# Patient Record
Sex: MALE | Race: OTHER | Employment: PART TIME | URBAN - METROPOLITAN AREA
[De-identification: names, ages, dates, MRNs, and addresses within clinical notes are randomized per-mention and may not be internally consistent; named-entity substitution may affect disease eponyms.]

---

## 2024-06-13 ENCOUNTER — HOSPITAL ENCOUNTER (EMERGENCY)
Age: 24
Discharge: PSYCHIATRIC OR SUBSTANCE USE FACILITY | End: 2024-06-14
Attending: EMERGENCY MEDICINE

## 2024-06-13 VITALS
TEMPERATURE: 98.4 F | HEART RATE: 64 BPM | DIASTOLIC BLOOD PRESSURE: 67 MMHG | OXYGEN SATURATION: 100 % | SYSTOLIC BLOOD PRESSURE: 109 MMHG | RESPIRATION RATE: 16 BRPM

## 2024-06-13 DIAGNOSIS — R44.0 VERBAL AUDITORY HALLUCINATION: ICD-10-CM

## 2024-06-13 DIAGNOSIS — F29 PSYCHOSIS, UNSPECIFIED PSYCHOSIS TYPE (HCC): Primary | ICD-10-CM

## 2024-06-13 LAB
ALBUMIN SERPL-MCNC: 4.8 G/DL (ref 3.4–5)
ALBUMIN/GLOB SERPL: 1.8 {RATIO} (ref 1.1–2.2)
ALP SERPL-CCNC: 74 U/L (ref 40–129)
ALT SERPL-CCNC: 12 U/L (ref 10–40)
AMPHETAMINES UR QL SCN>1000 NG/ML: ABNORMAL
ANION GAP SERPL CALCULATED.3IONS-SCNC: 13 MMOL/L (ref 3–16)
APAP SERPL-MCNC: <5 UG/ML (ref 10–30)
AST SERPL-CCNC: 14 U/L (ref 15–37)
BARBITURATES UR QL SCN>200 NG/ML: ABNORMAL
BASOPHILS # BLD: 0 K/UL (ref 0–0.2)
BASOPHILS NFR BLD: 0.5 %
BENZODIAZ UR QL SCN>200 NG/ML: ABNORMAL
BILIRUB SERPL-MCNC: 0.4 MG/DL (ref 0–1)
BILIRUB UR QL STRIP.AUTO: NEGATIVE
BUN SERPL-MCNC: 14 MG/DL (ref 7–20)
CALCIUM SERPL-MCNC: 9.6 MG/DL (ref 8.3–10.6)
CANNABINOIDS UR QL SCN>50 NG/ML: POSITIVE
CHLORIDE SERPL-SCNC: 100 MMOL/L (ref 99–110)
CLARITY UR: CLEAR
CO2 SERPL-SCNC: 25 MMOL/L (ref 21–32)
COCAINE UR QL SCN: ABNORMAL
COLOR UR: YELLOW
CREAT SERPL-MCNC: 0.7 MG/DL (ref 0.9–1.3)
DEPRECATED RDW RBC AUTO: 12.5 % (ref 12.4–15.4)
DRUG SCREEN COMMENT UR-IMP: ABNORMAL
EOSINOPHIL # BLD: 0.2 K/UL (ref 0–0.6)
EOSINOPHIL NFR BLD: 2.2 %
ETHANOLAMINE SERPL-MCNC: NORMAL MG/DL (ref 0–0.08)
FENTANYL SCREEN, URINE: ABNORMAL
FLUAV RNA RESP QL NAA+PROBE: NOT DETECTED
FLUBV RNA RESP QL NAA+PROBE: NOT DETECTED
GFR SERPLBLD CREATININE-BSD FMLA CKD-EPI: >90 ML/MIN/{1.73_M2}
GLUCOSE SERPL-MCNC: 113 MG/DL (ref 70–99)
GLUCOSE UR STRIP.AUTO-MCNC: NEGATIVE MG/DL
HCT VFR BLD AUTO: 45.5 % (ref 40.5–52.5)
HGB BLD-MCNC: 15.9 G/DL (ref 13.5–17.5)
HGB UR QL STRIP.AUTO: NEGATIVE
KETONES UR STRIP.AUTO-MCNC: NEGATIVE MG/DL
LEUKOCYTE ESTERASE UR QL STRIP.AUTO: NEGATIVE
LYMPHOCYTES # BLD: 1.9 K/UL (ref 1–5.1)
LYMPHOCYTES NFR BLD: 24.3 %
MCH RBC QN AUTO: 30.7 PG (ref 26–34)
MCHC RBC AUTO-ENTMCNC: 34.9 G/DL (ref 31–36)
MCV RBC AUTO: 87.8 FL (ref 80–100)
METHADONE UR QL SCN>300 NG/ML: ABNORMAL
MONOCYTES # BLD: 0.6 K/UL (ref 0–1.3)
MONOCYTES NFR BLD: 7.4 %
MUCOUS THREADS #/AREA URNS LPF: ABNORMAL /LPF
NEUTROPHILS # BLD: 5.1 K/UL (ref 1.7–7.7)
NEUTROPHILS NFR BLD: 65.6 %
NITRITE UR QL STRIP.AUTO: NEGATIVE
OPIATES UR QL SCN>300 NG/ML: ABNORMAL
OXYCODONE UR QL SCN: ABNORMAL
PCP UR QL SCN>25 NG/ML: ABNORMAL
PH UR STRIP.AUTO: 7 [PH] (ref 5–8)
PH UR STRIP: 7 [PH]
PLATELET # BLD AUTO: 218 K/UL (ref 135–450)
PMV BLD AUTO: 8.4 FL (ref 5–10.5)
POTASSIUM SERPL-SCNC: 3.9 MMOL/L (ref 3.5–5.1)
PROT SERPL-MCNC: 7.4 G/DL (ref 6.4–8.2)
PROT UR STRIP.AUTO-MCNC: ABNORMAL MG/DL
RBC # BLD AUTO: 5.18 M/UL (ref 4.2–5.9)
RBC #/AREA URNS HPF: ABNORMAL /HPF (ref 0–4)
SALICYLATES SERPL-MCNC: <0.3 MG/DL (ref 15–30)
SARS-COV-2 RNA RESP QL NAA+PROBE: NOT DETECTED
SODIUM SERPL-SCNC: 138 MMOL/L (ref 136–145)
SP GR UR STRIP.AUTO: 1.02 (ref 1–1.03)
UA COMPLETE W REFLEX CULTURE PNL UR: ABNORMAL
UA DIPSTICK W REFLEX MICRO PNL UR: YES
URN SPEC COLLECT METH UR: ABNORMAL
UROBILINOGEN UR STRIP-ACNC: 0.2 E.U./DL
WBC # BLD AUTO: 7.8 K/UL (ref 4–11)
WBC #/AREA URNS HPF: ABNORMAL /HPF (ref 0–5)

## 2024-06-13 PROCEDURE — 82077 ASSAY SPEC XCP UR&BREATH IA: CPT

## 2024-06-13 PROCEDURE — 80179 DRUG ASSAY SALICYLATE: CPT

## 2024-06-13 PROCEDURE — 99285 EMERGENCY DEPT VISIT HI MDM: CPT

## 2024-06-13 PROCEDURE — 80053 COMPREHEN METABOLIC PANEL: CPT

## 2024-06-13 PROCEDURE — 80307 DRUG TEST PRSMV CHEM ANLYZR: CPT

## 2024-06-13 PROCEDURE — 81001 URINALYSIS AUTO W/SCOPE: CPT

## 2024-06-13 PROCEDURE — 80143 DRUG ASSAY ACETAMINOPHEN: CPT

## 2024-06-13 PROCEDURE — 87636 SARSCOV2 & INF A&B AMP PRB: CPT

## 2024-06-13 PROCEDURE — 90791 PSYCH DIAGNOSTIC EVALUATION: CPT | Performed by: SOCIAL WORKER

## 2024-06-13 PROCEDURE — 85025 COMPLETE CBC W/AUTO DIFF WBC: CPT

## 2024-06-13 ASSESSMENT — PAIN - FUNCTIONAL ASSESSMENT: PAIN_FUNCTIONAL_ASSESSMENT: NONE - DENIES PAIN

## 2024-06-13 NOTE — ED PROVIDER NOTES
Emergency Department Attending SUPERVISORY Provider Note  Location: [facility]  ED  6/13/2024     Chief Complaint   Patient presents with    Anxiety    Depression     Anxiety and depression, started hearing voices in his head 4 months ago. No SI or HI. Says the voices are telling him they're going to kill him.        PATIENT ID:  Bharath Ojeda is a 23 y.o. male    History reviewed. No pertinent past medical history.    Bharath Ojeda was evaluated in the Emergency Department for concerns of hearing voices over the past 4 months, uncle bedside saying that they are hurtful \"and harmful.  Patient does not really divulge to the family what they are telling him to do.  Uncle is translating, patient primarily speaks English.  He has not tried any self-harm, and does not have any history of any ingestions.  Has never been treated for anything like this.  Uncle himself says he used to hear similar voices, and required treatment.  No other concerns including any suicidal or homicidal ideation.  No falls or injuries.  No recent medical concerns or any sort of hospitalizations or new medications..          Vitals:    06/13/24 2226   BP: 109/67   Pulse: 64   Resp: 16   Temp:    SpO2: 100%        BASIC EXAM:  No acute distress.  Interactive, conversational, pleasant.  Respiratory rate regular, lungs are clear to auscultation bilaterally.  No obvious murmurs.  No significant leg swelling.        No orders to display       Labs Reviewed   URINALYSIS WITH REFLEX TO CULTURE - Abnormal; Notable for the following components:       Result Value    Protein, UA TRACE (*)     All other components within normal limits   URINE DRUG SCREEN - Abnormal; Notable for the following components:    Cannabinoid Scrn, Ur POSITIVE (*)     All other components within normal limits   SALICYLATE LEVEL - Abnormal; Notable for the following components:    Salicylate Lvl <0.3 (*)     All other components within normal limits

## 2024-06-14 ENCOUNTER — HOSPITAL ENCOUNTER (INPATIENT)
Age: 24
LOS: 3 days | Discharge: HOME | DRG: 885 | End: 2024-06-17
Attending: PSYCHIATRY & NEUROLOGY | Admitting: PSYCHIATRY & NEUROLOGY

## 2024-06-14 PROBLEM — F29 PSYCHOSIS, UNSPECIFIED PSYCHOSIS TYPE (HCC): Status: ACTIVE | Noted: 2024-06-14

## 2024-06-14 PROBLEM — F12.90 CANNABIS USE DISORDER: Status: ACTIVE | Noted: 2024-06-14

## 2024-06-14 PROCEDURE — 6370000000 HC RX 637 (ALT 250 FOR IP): Performed by: PSYCHIATRY & NEUROLOGY

## 2024-06-14 PROCEDURE — 1240000000 HC EMOTIONAL WELLNESS R&B

## 2024-06-14 PROCEDURE — 6370000000 HC RX 637 (ALT 250 FOR IP)

## 2024-06-14 PROCEDURE — 99223 1ST HOSP IP/OBS HIGH 75: CPT | Performed by: PSYCHIATRY & NEUROLOGY

## 2024-06-14 RX ORDER — ACETAMINOPHEN 325 MG/1
650 TABLET ORAL EVERY 8 HOURS PRN
Status: DISCONTINUED | OUTPATIENT
Start: 2024-06-14 | End: 2024-06-17 | Stop reason: HOSPADM

## 2024-06-14 RX ORDER — ACETAMINOPHEN 325 MG/1
650 TABLET ORAL EVERY 4 HOURS PRN
Status: DISCONTINUED | OUTPATIENT
Start: 2024-06-14 | End: 2024-06-14

## 2024-06-14 RX ORDER — OLANZAPINE 5 MG/1
5 TABLET ORAL 3 TIMES DAILY PRN
Status: DISCONTINUED | OUTPATIENT
Start: 2024-06-14 | End: 2024-06-17 | Stop reason: HOSPADM

## 2024-06-14 RX ORDER — HYDROXYZINE 50 MG/1
50 TABLET, FILM COATED ORAL 3 TIMES DAILY PRN
Status: DISCONTINUED | OUTPATIENT
Start: 2024-06-14 | End: 2024-06-17 | Stop reason: HOSPADM

## 2024-06-14 RX ORDER — MAGNESIUM HYDROXIDE/ALUMINUM HYDROXICE/SIMETHICONE 120; 1200; 1200 MG/30ML; MG/30ML; MG/30ML
30 SUSPENSION ORAL EVERY 6 HOURS PRN
Status: DISCONTINUED | OUTPATIENT
Start: 2024-06-14 | End: 2024-06-17 | Stop reason: HOSPADM

## 2024-06-14 RX ORDER — IBUPROFEN 400 MG/1
400 TABLET ORAL EVERY 6 HOURS PRN
Status: DISCONTINUED | OUTPATIENT
Start: 2024-06-14 | End: 2024-06-14

## 2024-06-14 RX ORDER — TRAZODONE HYDROCHLORIDE 50 MG/1
50 TABLET ORAL NIGHTLY PRN
Status: DISCONTINUED | OUTPATIENT
Start: 2024-06-14 | End: 2024-06-17 | Stop reason: HOSPADM

## 2024-06-14 RX ORDER — OLANZAPINE 5 MG/1
5 TABLET ORAL EVERY 4 HOURS PRN
Status: DISCONTINUED | OUTPATIENT
Start: 2024-06-14 | End: 2024-06-14

## 2024-06-14 RX ORDER — POLYETHYLENE GLYCOL 3350 17 G
2 POWDER IN PACKET (EA) ORAL
Status: DISCONTINUED | OUTPATIENT
Start: 2024-06-14 | End: 2024-06-17 | Stop reason: HOSPADM

## 2024-06-14 RX ORDER — DIPHENHYDRAMINE HYDROCHLORIDE 50 MG/ML
50 INJECTION INTRAMUSCULAR; INTRAVENOUS EVERY 4 HOURS PRN
Status: DISCONTINUED | OUTPATIENT
Start: 2024-06-14 | End: 2024-06-14

## 2024-06-14 RX ADMIN — HYDROXYZINE HYDROCHLORIDE 50 MG: 50 TABLET, FILM COATED ORAL at 02:58

## 2024-06-14 RX ADMIN — OLANZAPINE 5 MG: 5 TABLET, FILM COATED ORAL at 20:36

## 2024-06-14 RX ADMIN — TRAZODONE HYDROCHLORIDE 50 MG: 50 TABLET ORAL at 20:36

## 2024-06-14 ASSESSMENT — LIFESTYLE VARIABLES
HOW MANY STANDARD DRINKS CONTAINING ALCOHOL DO YOU HAVE ON A TYPICAL DAY: PATIENT DOES NOT DRINK
HOW OFTEN DO YOU HAVE A DRINK CONTAINING ALCOHOL: NEVER

## 2024-06-14 ASSESSMENT — PATIENT HEALTH QUESTIONNAIRE - PHQ9
1. LITTLE INTEREST OR PLEASURE IN DOING THINGS: SEVERAL DAYS
1. LITTLE INTEREST OR PLEASURE IN DOING THINGS: SEVERAL DAYS
SUM OF ALL RESPONSES TO PHQ QUESTIONS 1-9: 2
SUM OF ALL RESPONSES TO PHQ9 QUESTIONS 1 & 2: 2
SUM OF ALL RESPONSES TO PHQ QUESTIONS 1-9: 2
SUM OF ALL RESPONSES TO PHQ QUESTIONS 1-9: 2
SUM OF ALL RESPONSES TO PHQ9 QUESTIONS 1 & 2: 2
SUM OF ALL RESPONSES TO PHQ QUESTIONS 1-9: 2
2. FEELING DOWN, DEPRESSED OR HOPELESS: SEVERAL DAYS
SUM OF ALL RESPONSES TO PHQ QUESTIONS 1-9: 2
2. FEELING DOWN, DEPRESSED OR HOPELESS: SEVERAL DAYS
SUM OF ALL RESPONSES TO PHQ QUESTIONS 1-9: 2

## 2024-06-14 ASSESSMENT — SLEEP AND FATIGUE QUESTIONNAIRES
DO YOU HAVE DIFFICULTY SLEEPING: YES
SLEEP PATTERN: NORMAL
DO YOU HAVE DIFFICULTY SLEEPING: NO
DO YOU USE A SLEEP AID: NO
AVERAGE NUMBER OF SLEEP HOURS: 8
DO YOU USE A SLEEP AID: NO
SLEEP PATTERN: DISTURBED/INTERRUPTED SLEEP
AVERAGE NUMBER OF SLEEP HOURS: 8

## 2024-06-14 NOTE — ED NOTES
Chucho Wolfe, [facility], assigned as patient sitter @ 6144    Patient has been changed into a paper hospital gown per protocol.    Patient has had all of his belongings placed into bags, labeled, and removed from patient's vicinity. Patient did not present to the ER with any jewelry or piercings.     Patient's room has had all items that are removable and not medically required removed from the room per protocol.     Patient is currently calmly lying in bed.    Sitter is positioned in doorway and will remain vigilant.

## 2024-06-14 NOTE — ED NOTES
Per provider pt is now on a 72 hour hold for transfer to a psych facility.  Patient room has been modified to remove extraneous ligature risks prior to patient arrival. Pt Azeri speaking.   phone at bedside.  Constant Observer at bedside, patient aware of placement and reason.     For safety, patient is placed in a hospital gown without ties.     A witnessed search of patient belongings was performed by EM Mina and CRICKET Rankin . Patient belongings were secured and placed outside room.

## 2024-06-14 NOTE — PROGRESS NOTES
Home Medication Reconciliation Status          [x] COMPLETE       Medication history has been reviewed and obtained from the following source(s):       [] patient/family verbal report             [] patient/family provided written list       [] external pharmacy   [] external facility list         []  Provider notified that home medication reconciliation is complete          [] IN PROGRESS       Medication reconciliation marked in progress at this time due to:       [] patient/family poor historian      [] waiting arrival of family to clarify       [] waiting for accurate list        [] external pharmacy needs called      * Follow up is needed.          [] UNABLE TO ASSESS       Medication reconciliation is incomplete and unable to assess at this time due to:       [] critical patient condition   [] patient is unresponsive        [] no family available                       [] unknown pharmacy       [] anonymous patient          * Follow up is needed.      [] Pharmacy consult placed for medication reconciliation assistance   Additional comments:  patient denies taking any medication currently.

## 2024-06-14 NOTE — PROGRESS NOTES
4 Eyes Skin Assessment     NAME:  Bharath Ojeda  YOB: 2000  MEDICAL RECORD NUMBER:  [id number]    The patient is being assessed for  Admission    I agree that at least one RN has performed a thorough Head to Toe Skin Assessment on the patient. ALL assessment sites listed below have been assessed.      Areas assessed by both nurses:    Head, Face, Ears, Shoulders, Back, Chest, Arms, Elbows, Hands, Sacrum. Buttock, Coccyx, Ischium, and Legs. Feet and Heels        Does the Patient have a Wound? No noted wound(s)     No fresh injury noted nor reported.  Jack Prevention initiated by RN: No  Wound Care Orders initiated by RN: No    Pressure Injury (Stage 3,4, Unstageable, DTI, NWPT, and Complex wounds) if present, place Wound referral order by RN under : No    New Ostomies, if present place, Ostomy referral order under : No     Nurse 1 eSignature: Electronically signed by Astrid Cosby RN on 6/14/24 at 3:30 AM EDT    **SHARE this note so that the co-signing nurse can place an eSignature**    Nurse 2 eSignature: Electronically signed by Niyah Glass RN on 6/14/24 at 3:58 AM EDT

## 2024-06-14 NOTE — BH NOTE
[facility]  Treatment Team Note  Review Date & Time: 6/14/2024  0937    Patient was not in treatment team      Status EXAM:   Mental Status and Behavioral Exam  Normal: No  Level of Assistance: Independent/Self  Facial Expression: Avoids Gaze, Flat, Worried  Affect: Constricted  Level of Consciousness: Alert  Frequency of Checks: 4 times per hour, close  Mood:Normal: No  Mood: Anxious  Motor Activity:Normal: Yes  Eye Contact: Fair  Observed Behavior: Cooperative, Friendly  Sexual Misconduct History: Current - no  Preception: Clar[place] to person, Claridge to time, Claridge to place, [place] to situation  Attention:Normal: No  Attention: Distractible  Thought Processes: Unremarkable  Thought Content:Normal: No  Thought Content: Paranoia  Depression Symptoms: No problems reported or observed.  Anxiety Symptoms: Generalized  Arleen Symptoms: Poor judgment  Hallucinations: Auditory (comment) (hearing voices telling him bad things but none commanding, denies currently)  Delusions: Yes  Delusions: Paranoid  Memory:Normal: Yes  Insight and Judgment: No  Insight and Judgment: Poor judgment, Poor insight      Suicide Risk CSSR-S:  1) Within the past month, have you wished you were dead or wished you could go to sleep and not wake up? : No  2) Have you actually had any thoughts of killing yourself? : No  6) Have you ever done anything, started to do anything, or prepared to do anything to end your life?: No      PLAN/TREATMENT RECOMMENDATIONS UPDATE: Patient will take medication as prescribed, eat 75% of meals, attend groups, participate in milieu activities, participate in treatment team and care planning for discharge and follow up.            Rachel Kwan RN

## 2024-06-14 NOTE — PROGRESS NOTES
Patient presents to [facility] ED with uncle states that he has been hearing voices for about 4 to 5 months, states that they have been getting progressively worse, no SI/HI, he states that the voices are just hateful and say mean things to him and tell him that they are going to kill him. Per uncle while at work, patient started yelling saying \"shut up,\" and asking others \"did you hear that?\" Per family report last few months patient starts to talk to himself, endorsing paranoia about cameras watching him, microphones listening to him and his ex-girlfriend and her boyfriend following him. He tells his family that the voices are bullying him and \"making him do things.\" Per uncle he found out pt starts using marijuana same time he started hearing voices.     Upon admission patient ws pleasant & cooperative, appears guarded but able to answer questions with the help of interpreters with ID no: [id number] & [id number]. Patient denies any SI/HI/VH, states that he's been hearing voices but none currently, last episode was about 8 hrs ago. He denied any voices that is commanding in nature, states that the voices are just telling him bad & mean things about him. He states that he will inform the staff if in case starts to hear voices or if at anytime he feels unsafe. He asked for medication for anxiety & Atarax 50mg was given without any issues. Admission was done with the help of  & all admission papers was explained & interpreted for him. Safe environment provided & maintained, no issues noted at this time.

## 2024-06-14 NOTE — PROGRESS NOTES
PRN Atarax 50mg given appears to be effective, patient seen asleep since 0330, no further complaints made. Still sleeping at this time, no signs of distress noted.

## 2024-06-14 NOTE — PROGRESS NOTES
0258, patient requested for some medication to help with anxiety, Atarax 50mg given without any issues.

## 2024-06-14 NOTE — ED PROVIDER NOTES
[facility]  ED  EMERGENCY DEPARTMENT ENCOUNTER        Pt Name: Bharath Ojeda  MRN: [id number]  Birthdate 2000  Date of evaluation: 6/13/2024  Provider: NEVAEH Christensen CNP  PCP: No primary care provider on file.  Note Started: 10:11 PM EDT 6/13/24      DECLAN. I have evaluated this patient.        CHIEF COMPLAINT       Chief Complaint   Patient presents with    Anxiety    Depression     Anxiety and depression, started hearing voices in his head 4 months ago. No SI or HI. Says the voices are telling him they're going to kill him.       HISTORY OF PRESENT ILLNESS: 1 or more Elements     History From: the patient  Limitations to history : None    Bharath Ojeda is a 23 y.o. male who presents to the ER with complaints of hearing voices.  Patient states that he has been hearing voices for about 4 to 5 months, states that they have been getting progressively worse, denies any suicidal homicidal ideations, he states that the voices are just hateful and say mean things to him and tell him that they are going to kill him.  He is here for further evaluation.  He denied any visual hallucinations.    Nursing Notes were all reviewed and agreed with or any disagreements were addressed in the HPI.    REVIEW OF SYSTEMS :      Review of Systems    Positives and Pertinent negatives as per HPI.     SURGICAL HISTORY   History reviewed. No pertinent surgical history.    CURRENTMEDICATIONS       Previous Medications    No medications on file       ALLERGIES     Patient has no known allergies.    FAMILYHISTORY     History reviewed. No pertinent family history.     SOCIAL HISTORY       Social History     Substance Use Topics    Drug use: Yes     Frequency: 7.0 times per week     Types: Marijuana (Weed)       SCREENINGS        [place] Coma Scale  Eye Opening: Spontaneous  Best Verbal Response: Oriented  Best Motor Response: Obeys commands  [place] Coma Scale Score: 15                CIWA

## 2024-06-14 NOTE — VIRTUAL HEALTH
Anxiety  Previous suicide attempts/self-harm: Denies  Inpatient psychiatric hospitalizations: no  Current outpatient psychiatric provider: Denies  Current therapist: States not in therapy  Previous psychiatric medication trials: No prior medication trials  Current psychiatric medications: No current psychiatric medications  Family Psychiatric History: states an uncle has shared he also heard voices     Sleep Hours: 6-7    Sleep concerns: denies    Use of sleep medications: denies    Substance Abuse History:  Tobacco: Denies  Alcohol: Denies  Marijuana: Endorses    Stimulant: Denies  Opiates: Denies  Benzodiazepine: Denies  Other illicit drug usage: Denies  History of substance/alcohol abuse treatment: Denies    Social History:  Education: H.S.  Living Situation/Interest: with family - brothers   Marital/Committed relationship and parenting hx: single  Occupation: just working   Legal History/Hx of Violence: FERNANDO  Spiritual History: [facility]  Psychological trauma, neglect, or abuse: denies hx of trauma/abuse   Access to guns or other weapons: denies having access to firearms/dangerous weapons     Past Medical History:  Active Ambulatory Problems     Diagnosis Date Noted    No Active Ambulatory Problems     Resolved Ambulatory Problems     Diagnosis Date Noted    No Resolved Ambulatory Problems     No Additional Past Medical History     Allergies:  No Known Allergies   Medications:  No current facility-administered medications for this encounter.  No current outpatient medications on file.  Not in a hospital admission.  Prior to Admission medications    Not on File        Labs:  UDS: Cannabinoid Scrn +  ETOH: negative  HCG: Negative      Mental Status Exam:  Level of consciousness:  within normal limits   Appearance:  well-appearing, street clothes, and seated in bed.  Does appear stated age. No acute distress.  Behavior/Motor:  no abnormalities noted  Attitude toward examiner:  cooperative and guarded  SI/HI:Denies

## 2024-06-14 NOTE — CARE COORDINATION
Clinician met with the patient and the in person interpretor to conduct the leisure assessment. Patient was cooperative answering the questions.  Saba Beltrán, MSW     06/14/24 7097   Activities of Daily Living   Patient Requires assistance with daily self-care activities? No   Leisure Activity 1   3 Favorite Leisure Activities video games   Frequency > 2 hours/day   Last time this week   Leisure Activity 2   Favorite Leisure Activities  listens to music   Frequency  > 2 hours/day   Last time  this week   Leisure Activity 3   Favorite Leisure Activities  intenet   Frequency  > 2 hours/day   Last time  this week   Social   Patient reports spending the majority of their free time with a group   Patient verbalizes a preference for spending free time with a group   Patient’s perception of support system healthy/strong   Social Details language   Beliefs & Coping   Has difficulty dealing with feelings   No   Internalizes feelings/Keeps feelings in Yes   Externalizes feelings through aggressiveness or poor temper control  Yes   Feels uncomfortable around others  Yes   Has difficulty talking to others  Yes   Depends on others for direction or decisions Yes   Difficulty dealing with anger of others  No   Difficulty dealing with own anger  No   Difficulty managing stress No   Frequently has difficulty with relationships  No   Has recently perceived/experienced loss, disappointment, humiliation or failure  NO   General perception about self likes self   Attitude about abilities occasionally fails   Locus of Control  sometimes   Belief about recovery Recovery is possible   Patient Identified Strengths  Patience   Patient Identified Limitations  Sharing thoughts and feelings   Perception of most stressful event prior to hospitalization Break up with girlfriend of 2 years   Perception of changes needed \"get out of my head and start relaxing\"   Strengths and Limitations   Strengths Educated;Motivated for change       
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

## 2024-06-14 NOTE — PROGRESS NOTES
0152, patient came in on stretcher via ambulance from [facility] accompanied by EMS staff & security. Patient appears physically well, alert, oriented, pleasant & cooperative, wearing paper gown. Patient was checked for any contraband item & non was found. All belongings labeled & secured. Oriented to the unit & his room, VS checked & recorded all within normal, no fresh injury noted nor reported. Assisted patient to change into hospital gown, agreed to participate in the admission process. Safe environment provided & maintained. Snacks & drinks provided.

## 2024-06-14 NOTE — ED NOTES
Pt uncle at bedside.  Pt denies SI/HI.  Pt is not a sitter case or on 72 hour hold per provider at this time.

## 2024-06-14 NOTE — BH NOTE
Clinician met with the patient and the in person interpretor along with his brothers. MELANIE was obtained for the brothers. Patient was open to going to community mental health, did not want a PCP set up due to no insurance. Clinician provided the information to Jolene Mckinney and went over the details of the discharge plan with the interpretor. Patient verified the address in the file as his discharge destination.

## 2024-06-14 NOTE — PROGRESS NOTES
[facility]  Admission Note     Admission Type:   Admission Type: Voluntary    Reason for admission:  Reason for Admission: psychosis      Addictive Behavior:   Addictive Behavior  In the Past 3 Months, Have You Felt or Has Someone Told You That You Have a Problem With  : None    Medical Problems:   History reviewed. No pertinent past medical history.    Status EXAM:  Mental Status and Behavioral Exam  Normal: No  Level of Assistance: Independent/Self  Facial Expression: Avoids Gaze, Flat, Worried  Affect: Constricted  Level of Consciousness: Alert  Frequency of Checks: 4 times per hour, close  Mood:Normal: No  Mood: Anxious  Motor Activity:Normal: Yes  Eye Contact: Fair  Observed Behavior: Cooperative, Friendly  Sexual Misconduct History: Current - no  Preception: [place] to person, [place] to time, [place] to place, [place] to situation  Attention:Normal: No  Attention: Distractible  Thought Processes: Unremarkable  Thought Content:Normal: No  Thought Content: Paranoia  Depression Symptoms: No problems reported or observed.  Anxiety Symptoms: Generalized  Arleen Symptoms: Poor judgment  Hallucinations: Auditory (comment) (hearing voices telling him bad things but none commanding, denies currently)  Delusions: Yes  Delusions: Paranoid  Memory:Normal: Yes  Insight and Judgment: No  Insight and Judgment: Poor judgment, Poor insight    Tobacco Screening:  Practical Counseling, on admission, ilene X, if applicable and completed (first 3 are required if patient doesn't refuse):            ( ) Recognizing danger situations (included triggers and roadblocks)                    ( ) Coping skills (new ways to manage stress,relaxation techniques, changing routine, distraction)                                                           ( ) Basic information about quitting (benefits of quitting, techniques in how to quit, available resources  ( ) [facility]

## 2024-06-15 PROBLEM — Z00.00 ENCOUNTER FOR ROUTINE HISTORY AND PHYSICAL EXAMINATION OF ADULT: Status: ACTIVE | Noted: 2024-06-15

## 2024-06-15 PROCEDURE — 6370000000 HC RX 637 (ALT 250 FOR IP)

## 2024-06-15 PROCEDURE — 99221 1ST HOSP IP/OBS SF/LOW 40: CPT | Performed by: NURSE PRACTITIONER

## 2024-06-15 PROCEDURE — 99233 SBSQ HOSP IP/OBS HIGH 50: CPT | Performed by: PSYCHIATRY & NEUROLOGY

## 2024-06-15 PROCEDURE — 1240000000 HC EMOTIONAL WELLNESS R&B

## 2024-06-15 RX ADMIN — TRAZODONE HYDROCHLORIDE 50 MG: 50 TABLET ORAL at 22:57

## 2024-06-15 NOTE — PROGRESS NOTES
This pt  arrives onto unit and does assist this nurse in the shift assessment. Pt is calm and cooperative. Pt encouraged to come out of his room and interact with peers, pt verbalizes his understanding. Pt's lunch taken into his room where he eats 100%. Pt given an ice water but declines the offer for anything other to drink. Will cont to monitor.

## 2024-06-15 NOTE — H&P
Hospital Medicine History & Physical      PCP: No primary care provider on file.    Date of Admission: 6/14/2024    Date of Service: Pt seen/examined on 6/15/2024     Chief Complaint:  auditory hallucinations      History Of Present Illness:      The patient is a 23 y.o. male with no known significant medical history who presented to Health system ED with complaint of depression and anxiety, auditory hallucinations.  Patient was seen and evaluated in the ED by the ED medical provider, patient was medically cleared for admission to [facility].  This note serves as an admission medical H&P.    PCP: No primary care provider on file.  Tobacco use: never  ETOH use: denies  Illicit drug use: cannabis    Patient denies any symptoms/complaints.    Past Medical History:    History reviewed. No pertinent past medical history.    Past Surgical History:    History reviewed. No pertinent surgical history.    Medications Prior to Admission:    Prior to Admission medications    Not on File       Allergies:  Patient has no known allergies.    Social History:     TOBACCO:   reports that he has never smoked. He has never used smokeless tobacco.  ETOH:   reports no history of alcohol use.      Family History:   Positive as follows:    History reviewed. No pertinent family history.    REVIEW OF SYSTEMS:       Constitutional: Negative for fever   HENT: Negative for sore throat   Respiratory: Negative  for dyspnea, cough   Cardiovascular: Negative for chest pain   Gastrointestinal: Negative for vomiting, diarrhea   Genitourinary: Negative for dysuria  Musculoskeletal: Negative for arthralgias   Skin: Negative for rash   Neurological: Negative for syncope   Psychiatric/Behavorial: per psychiatric evaluation    PHYSICAL EXAM:    BP (!) 127/54   Pulse 71   Temp 98.2 °F (36.8 °C) (Oral)   Resp 16   Ht 1.6 m (5' 3\")   Wt 58.1 kg (128 lb)   SpO2 98%   BMI 22.67 kg/m²     Gen: No distress. Alert.   Eyes: PERRL. No sclera icterus. No conjunctival 
adolfo.  His thought processes are organized.  He presents with a full affect.  He describes his mood as \"normal.\"    STRESSORS:  None per patient.    PAST PSYCHIATRIC HISTORY:  None whatsoever.    SUBSTANCE USE HISTORY:  Cannabis - flower once daily for the last 4 years.  No other substances.  No treatment programs.    PAST MEDICAL HISTORY:  No chronic conditions, traumatic brain injury, seizures, or major surgeries.    FAMILY PSYCHIATRIC HISTORY:  Maternal uncle, psychosis.  No suicides.    MEDICATIONS:  None.    ALLERGIES:  NO KNOWN DRUG ALLERGIES.    SOCIAL HISTORY:  Born in [place].  Three brothers and 1 sister.  Parents .  Growing up was okay.  He has completed the 12th grade.  Came to [place] 3 years ago.  Never .  No kids.  Lives in [place] with his 2 brothers.  He works as a cook in a restaurant.    LEGAL HISTORY:  None.    REVIEW OF SYSTEMS:  He is vaccinated for COVID.  He reports recent sneezing, but otherwise no headaches, changes in vision, chest pain, shortness of breath, cough, sore throat, fevers, abdominal pain, neurological problems, bleeding problems, or skin problems.  He moves and speaks without difficulty.    MENTAL STATUS EXAMINATION:  He presented in hospital gown.  He was guarded, but opened with conversation.  He made fair eye contact.  He described his mood as \"okay\" and had a blunted but otherwise full affect.  He had no psychomotor agitation or retardation.    He spoke in a normal volume.  He was not pressured.  He was oriented to the day, date, place, and the context of this evaluation.  His memory was intact.    His use of language, speech, and educational attainment suggested an average level of intellectual functioning.    His thought processes were mostly organized according to the .  He did not describe or endorse homicidal or suicidal thinking.  He did endorse worsening auditory hallucinations and paranoid delusions.    His ability for abstract

## 2024-06-16 PROCEDURE — 1240000000 HC EMOTIONAL WELLNESS R&B

## 2024-06-16 PROCEDURE — 6370000000 HC RX 637 (ALT 250 FOR IP)

## 2024-06-16 RX ADMIN — HYDROXYZINE HYDROCHLORIDE 50 MG: 50 TABLET, FILM COATED ORAL at 22:45

## 2024-06-16 RX ADMIN — TRAZODONE HYDROCHLORIDE 50 MG: 50 TABLET ORAL at 22:45

## 2024-06-16 NOTE — PROGRESS NOTES
[facility]  Medicare Certification Upon Admission    I certify that this patient's inpatient psychiatric hospital admission is medically necessary for:    [x] (1) Treatment which could reasonably be expected to improve this patient's condition,       [x] (2) Or for diagnostic study;     AND     [x](2) The inpatient psychiatric services are provided while the individual is under the care of a physician and are included in the individualized plan of care.    Estimated length of stay/service 4-6 days    Plan for post-hospital care incomplete     Electronically signed by DO DEVLIN MD on 6/16/2024 at 7:33 AM

## 2024-06-16 NOTE — PROGRESS NOTES
Department of Psychiatry  Progress Note    Patient's chart was reviewed. Discussed with treatment team. Met with patient.     SUBJECTIVE:      \"Very good\"    Reports feeling more relaxed.     No-longer endorsing or voicing AH. Says he's hasn't experienced them since admission.     Acknowledges the paranoid he has before admission, namely, the feeling he was being monitored through cameras and microphones. Says that is not happening here.    Affect his full. He is no disorganized.    Has demonstrated safe behavior since admission.     ROS:   Patient has new complaints: no  Sleeping adequately:  Yes   Appetite adequate: Yes  Engaged in programming: Yes    OBJECTIVE:  VITALS:  /61   Pulse 76   Temp 98.1 °F (36.7 °C) (Temporal)   Resp 18   Ht 1.6 m (5' 3\")   Wt 58.1 kg (128 lb)   SpO2 97%   BMI 22.67 kg/m²     Mental Status Examination:    Appearance: fair grooming and hygiene  Behavior/Attitude toward examiner:  cooperative, attentive and fair eye contact  Speech: Normal rate, volume, amount  Mood:  \"better\"  Affect:  mood congruent   Thought processes:  Goal directed, linear, no DEREK or gross disorganization  Thought Content: no SI, no HI,less delusional   Perceptions: reduced AH  Attention: attention span and concentration were intact to interview   Abstraction: intact  Cognition:  Alert and oriented to person, place, time, and situation, recall intact  Insight: fair  Judgment: fair    Medication:  Scheduled:       PRN:  magnesium hydroxide, nicotine polacrilex, aluminum & magnesium hydroxide-simethicone, hydrOXYzine HCl, traZODone, acetaminophen, OLANZapine **OR** [DISCONTINUED] OLANZapine (ZyPREXA) 10 mg in sterile water 2 mL injection     FORMULATION: This is a domiciled, never , and employed 23-year-old [place] immigrant without psychiatric history, whose uncle brought him to [facility] Emergency Department with worsening psychotic symptoms. He presents with auditory hallucinations,

## 2024-06-17 VITALS
RESPIRATION RATE: 16 BRPM | SYSTOLIC BLOOD PRESSURE: 98 MMHG | BODY MASS INDEX: 22.68 KG/M2 | OXYGEN SATURATION: 97 % | WEIGHT: 128 LBS | HEIGHT: 63 IN | TEMPERATURE: 97.7 F | HEART RATE: 76 BPM | DIASTOLIC BLOOD PRESSURE: 63 MMHG

## 2024-06-17 PROCEDURE — 5130000000 HC BRIDGE APPOINTMENT

## 2024-06-17 NOTE — BH NOTE
Bridge Appointment completed: Reviewed Discharge Instructions with patient.    Patient verbalizes understanding and agreement with the discharge plan using the teachback method.       Vaccinations (ilene X if applicable and completed):  ( ) Patient states already received influenza vaccine elsewhere  ( ) Patient received influenza vaccine during this hospitalization  ( ) Patient refused influenza vaccine at this time  ( X) Not offered

## 2024-06-17 NOTE — PLAN OF CARE
Problem: Anxiety  Goal: Will report anxiety at manageable levels  Description: INTERVENTIONS:  1. Administer medication as ordered  2. Teach and rehearse alternative coping skills  3. Provide emotional support with 1:1 interaction with staff  6/14/2024 1615 by Saba Jacobo RN  Outcome: Progressing  Flowsheets (Taken 6/14/2024 1607)  Will report anxiety at manageable levels:   Teach and rehearse alternative coping skills   Provide emotional support with 1:1 interaction with staff  6/14/2024 0325 by Astrid Cosby, EM  Outcome: Progressing     Problem: Coping  Goal: Pt/Family able to verbalize concerns and demonstrate effective coping strategies  Description: INTERVENTIONS:  1. Assist patient/family to identify coping skills, available support systems and cultural and spiritual values  2. Provide emotional support, including active listening and acknowledgement of concerns of patient and caregivers  3. Reduce environmental stimuli, as able  4. Instruct patient/family in relaxation techniques, as appropriate  5. Assess for spiritual pain/suffering and initiate Spiritual Care, Psychosocial Clinical Specialist consults as needed  6/14/2024 1615 by Saba Jacobo RN  Outcome: Progressing  6/14/2024 0325 by Astrid Cosby RN  Outcome: Progressing     Problem: Behavior  Goal: Pt/Family maintain appropriate behavior and adhere to behavioral management agreement, if implemented  Description: INTERVENTIONS:  1. Assess patient/family's coping skills and  non-compliant behavior (including use of illegal substances)  2. Notify security of behavior or suspected illegal substances which indicate the need for search of the family and/or belongings  3. Encourage verbalization of thoughts and concerns in a socially appropriate manner  4. Utilize positive, consistent limit setting strategies supporting safety of patient, staff and others  5. Encourage participation in the decision making process 
  Problem: Anxiety  Goal: Will report anxiety at manageable levels  Description: INTERVENTIONS:  1. Administer medication as ordered  2. Teach and rehearse alternative coping skills  3. Provide emotional support with 1:1 interaction with staff  Outcome: Progressing     Problem: Coping  Goal: Pt/Family able to verbalize concerns and demonstrate effective coping strategies  Description: INTERVENTIONS:  1. Assist patient/family to identify coping skills, available support systems and cultural and spiritual values  2. Provide emotional support, including active listening and acknowledgement of concerns of patient and caregivers  3. Reduce environmental stimuli, as able  4. Instruct patient/family in relaxation techniques, as appropriate  5. Assess for spiritual pain/suffering and initiate Spiritual Care, Psychosocial Clinical Specialist consults as needed  Outcome: Progressing     Problem: Behavior  Goal: Pt/Family maintain appropriate behavior and adhere to behavioral management agreement, if implemented  Description: INTERVENTIONS:  1. Assess patient/family's coping skills and  non-compliant behavior (including use of illegal substances)  2. Notify security of behavior or suspected illegal substances which indicate the need for search of the family and/or belongings  3. Encourage verbalization of thoughts and concerns in a socially appropriate manner  4. Utilize positive, consistent limit setting strategies supporting safety of patient, staff and others  5. Encourage participation in the decision making process about the behavioral management agreement  6. If a visitor's behavior poses a threat to safety call refer to organization policy.  7. Initiate consult with , Psychosocial CNS, Spiritual Care as appropriate  Outcome: Progressing     Problem: Confusion  Goal: Confusion, delirium, dementia, or psychosis is improved or at baseline  Description: INTERVENTIONS:  1. Assess for possible contributors to 
  Problem: Anxiety  Goal: Will report anxiety at manageable levels  Description: INTERVENTIONS:  1. Administer medication as ordered  2. Teach and rehearse alternative coping skills  3. Provide emotional support with 1:1 interaction with staff  Outcome: Progressing  Flowsheets (Taken 6/16/2024 2245)  Will report anxiety at manageable levels:   Administer medication as ordered   Provide emotional support with 1:1 interaction with staff     Problem: Coping  Goal: Pt/Family able to verbalize concerns and demonstrate effective coping strategies  Description: INTERVENTIONS:  1. Assist patient/family to identify coping skills, available support systems and cultural and spiritual values  2. Provide emotional support, including active listening and acknowledgement of concerns of patient and caregivers  3. Reduce environmental stimuli, as able  4. Instruct patient/family in relaxation techniques, as appropriate  5. Assess for spiritual pain/suffering and initiate Spiritual Care, Psychosocial Clinical Specialist consults as needed  Outcome: Progressing  Flowsheets (Taken 6/16/2024 2245)  Patient/family able to verbalize anxieties, fears, and concerns, and demonstrate effective coping: Assist patient/family to identify coping skills, available support systems and cultural and spiritual values     Problem: Confusion  Goal: Confusion, delirium, dementia, or psychosis is improved or at baseline  Description: INTERVENTIONS:  1. Assess for possible contributors to thought disturbance, including medications, impaired vision or hearing, underlying metabolic abnormalities, dehydration, psychiatric diagnoses, and notify attending LIP  2. [place] high risk fall precautions, as indicated  3. Provide frequent short contacts to provide reality reorientation, refocusing and direction  4. Decrease environmental stimuli, including noise as appropriate  5. Monitor and intervene to maintain adequate nutrition, hydration, elimination, sleep 
6/15/2024 @ 1617  Pt has been withdrawn to his room all day. His  has been present to assist in person with assessment questions. Pt is denying audible halls thus far into shift and is not noted to be RTIS. Denies SI/HI and visual halls. Pt also denies pain. Pt is strongly encouraged to come out of his room and socialize/interact with peers but he chooses not too. Pt has no c/o at this time. No morning nor afternoon meds ordered and pt denies needing any PRN meds this shift. No behavioral issues. This nurse will cont to monitor.       Problem: Anxiety  Goal: Will report anxiety at manageable levels  Description: INTERVENTIONS:  1. Administer medication as ordered  2. Teach and rehearse alternative coping skills  3. Provide emotional support with 1:1 interaction with staff  Outcome: Progressing  Flowsheets (Taken 6/15/2024 1453)  Will report anxiety at manageable levels:   Provide emotional support with 1:1 interaction with staff   Administer medication as ordered     Problem: Coping  Goal: Pt/Family able to verbalize concerns and demonstrate effective coping strategies  Description: INTERVENTIONS:  1. Assist patient/family to identify coping skills, available support systems and cultural and spiritual values  2. Provide emotional support, including active listening and acknowledgement of concerns of patient and caregivers  3. Reduce environmental stimuli, as able  4. Instruct patient/family in relaxation techniques, as appropriate  5. Assess for spiritual pain/suffering and initiate Spiritual Care, Psychosocial Clinical Specialist consults as needed  Outcome: Progressing  Flowsheets (Taken 6/15/2024 1453)  Patient/family able to verbalize anxieties, fears, and concerns, and demonstrate effective coping: Provide emotional support, including active listening and acknowledgement of concerns of patient and caregivers     Problem: Confusion  Goal: Confusion, delirium, dementia, or psychosis is improved or at 
Lauren is alert and oriented X4. He is pleasant and cooperative. His assessment was done with assistance of a  ([employer] Job#[id number]). He states he has been hearing voices that are mean to him, but they are gone. He states he was not sleeping good before coming in. He admits to drinking a lot of caffeine and using THC. He states since being here he has cleared up and he is cautioned about MJ and possible additives in it. He verbalizes understanding. He denies SI/HI. He denies any hallucinations at this time and is not noted to be responding to internal stimuli. He will D/C home today and states he feels ready to.  
Pt refused H&P today. IM will reattempt tomorrow. Pt denies any needs at this time.    Marlyn Soria PA-C  06/14/24   
Notify security of behavior or suspected illegal substances which indicate the need for search of the patient and/or belongings   Utilize positive, consistent limit setting strategies supporting safety of patient, staff and others   Implement a Health Care Agreement if patient meets criteria   If a patient’s behavior jeopardizes the safety of the patient, staff, or others refer to organization policy. If a visitor’s behavior poses a threat to safety refer to organization policy   Encourage participation in the decision making process about the behavioral management agreement   Encourage verbalization of thoughts and concerns in a socially appropriate manner   Initiate consult with , Psychosocial Clinical Nurse Specialist, Spiritual Care as appropriate  6/14/2024 1615 by Saba Jacobo, RN  Outcome: Progressing     Problem: Psychosis  Goal: Will report no hallucinations or delusions  Outcome: Not Progressing     
to maintain adequate nutrition, hydration, elimination, sleep and activity  6. If unable to ensure safety without constant attention obtain sitter and review sitter guidelines with assigned personnel  7. Initiate Psychosocial CNS and Spiritual Care consult, as indicated  6/15/2024 2216 by Rocío Samuels LPN  Outcome: Progressing     Problem: Behavior  Goal: Pt/Family maintain appropriate behavior and adhere to behavioral management agreement, if implemented  Description: INTERVENTIONS:  1. Assess patient/family's coping skills and  non-compliant behavior (including use of illegal substances)  2. Notify security of behavior or suspected illegal substances which indicate the need for search of the family and/or belongings  3. Encourage verbalization of thoughts and concerns in a socially appropriate manner  4. Utilize positive, consistent limit setting strategies supporting safety of patient, staff and others  5. Encourage participation in the decision making process about the behavioral management agreement  6. If a visitor's behavior poses a threat to safety call refer to organization policy.  7. Initiate consult with , Psychosocial CNS, Spiritual Care as appropriate  6/15/2024 2216 by Rocío Samuels LPN  Outcome: Progressing     Problem: Depression  Goal: Will be euthymic at discharge  Description: INTERVENTIONS:  1. Administer medication as ordered  2. Provide emotional support via 1:1 interaction with staff  3. Encourage involvement in milieu/groups/activities  4. Monitor for social isolation  6/15/2024 2216 by Rocío Samuels LPN  Outcome: Progressing     Problem: Psychosis  Goal: Will report no hallucinations or delusions  Description: INTERVENTIONS:  1. Administer medication as  ordered  2. Assist with reality testing to support increasing orientation  3. Assess if patient's hallucinations or delusions are encouraging self harm or harm to others and intervene as appropriate  6/15/2024 2216 by 
no hallucinations or delusions  Description: INTERVENTIONS:  1. Administer medication as  ordered  2. Assist with reality testing to support increasing orientation  3. Assess if patient's hallucinations or delusions are encouraging self harm or harm to others and intervene as appropriate  Outcome: Progressing

## 2024-06-17 NOTE — TRANSITION OF CARE
Behavioral Health Transition Record    Patient Name: Bharath Ojeda  YOB: 2000   Medical Record Number: [id number]  Date of Admission: 6/14/2024  1:52 AM   Date of Discharge: 6/17/2024    Attending Provider: Gume Pascal MD   Discharging Provider: Gume Pascal MD   To contact this individual call [phone] and ask the  to page.  If unavailable, ask to be transferred to Behavioral Health Provider on call.  A Behavioral Health Provider will be available on call 24/7 and during holidays.    Primary Care Provider: No primary care provider on file.    No Known Allergies    Reason for Admission: Patient is a 23 y.o.  /  male who presented to the ED on 06/13/24 from home. Patient complains of hearing voices.  Patient states that he has been hearing voices for about 4 to 5 months, states that they have been getting progressively worse, denies any suicidal homicidal ideations, he states that the voices are just hateful and say mean things to him and tell him that they are going to kill him.  He is here for further evaluation.  He denied any visual hallucinations. Per collateral, pt has been decompensating for a few months and it has progressively gotten worse and having a hard time functioning.     Admission Diagnosis: Psychosis, unspecified psychosis type (HCC) [F29]    * No surgery found *    No results found for this visit on 06/14/24.    Immunizations administered during this encounter:   There is no immunization history on file for this patient.  Influenza Vaccination Status: None of the above/Not documented/Unable to determine from medical record documentation    Screening for Metabolic Disorders for Patients on Antipsychotic Medications  (Data obtained from the EMR)    Estimated Body Mass Index  Body mass index is 22.67 kg/m².      Vital Signs/Blood Pressure  BP (!) 98/52   Pulse 78   Temp 97.6 °F (36.4 °C) (Temporal)   Resp 17   Ht 1.6 m (5' 3\")

## 2024-06-17 NOTE — BH NOTE
Behavioral Health [place]  Discharge Note    Pt discharged with followings belongings:   Dental Appliances: None  Vision - Corrective Lenses: None  Hearing Aid: None  Jewelry: None  Body Piercings Removed: No  Clothing: Footwear, Shirt, Pants  Other Valuables: Money, Wallet (232 USD, wallet with 1 iD)   Valuables returned to patient. Patient educated on aftercare instructions: Yes  Information faxed to N/A   at 4:33 PM .Patient verbalize understanding of AVS:  Yes.    Status EXAM upon discharge:  Mental Status and Behavioral Exam  Normal: No  Level of Assistance: Independent/Self  Facial Expression: Brightened  Affect: Congruent  Level of Consciousness: Alert  Frequency of Checks: 4 times per hour, close  Mood:Normal: Yes  Mood: Anxious  Motor Activity:Normal: No  Motor Activity: Decreased  Eye Contact: Good  Observed Behavior: Cooperative, Friendly, Other (comment) (Isolative to room)  Sexual Misconduct History: Current - no  Preception: Draper to person, Draper to time, Draper to place, Draper to situation  Attention:Normal: Yes  Attention: Distractible  Thought Processes: Unremarkable (Linear)  Thought Content:Normal: Yes  Thought Content: Preoccupations  Depression Symptoms: No problems reported or observed.  Anxiety Symptoms: No problems reported or observed.  Arleen Symptoms: No problems reported or observed.  Hallucinations: None  Delusions: No  Delusions:  (pt does not present as delusional @ this time)  Memory:Normal: Yes  Memory: Poor recent  Insight and Judgment: No  Insight and Judgment: Poor judgment, Poor insight    Tobacco Screening:  Practical Counseling, on admission, ilene X, if applicable and completed (first 3 are required if patient doesn't refuse): Refused           ( ) Recognizing danger situations (included triggers and roadblocks)                    ( ) Coping skills (new ways to manage stress,relaxation techniques, changing routine, distraction)

## 2024-06-18 ENCOUNTER — FOLLOWUP TELEPHONE ENCOUNTER (OUTPATIENT)
Dept: PSYCHIATRY | Age: 24
End: 2024-06-18

## 2024-06-19 ENCOUNTER — FOLLOWUP TELEPHONE ENCOUNTER (OUTPATIENT)
Dept: PSYCHIATRY | Age: 24
End: 2024-06-19

## 2024-06-21 ENCOUNTER — FOLLOWUP TELEPHONE ENCOUNTER (OUTPATIENT)
Dept: PSYCHIATRY | Age: 24
End: 2024-06-21